# Patient Record
Sex: FEMALE | Race: BLACK OR AFRICAN AMERICAN
[De-identification: names, ages, dates, MRNs, and addresses within clinical notes are randomized per-mention and may not be internally consistent; named-entity substitution may affect disease eponyms.]

---

## 2019-12-22 NOTE — RAD
XR Chest Pa   Lat STANDARD



HISTORY: Shortness of breath



COMPARISON: None.



FINDINGS: Heart size and mediastinum are within normal limits. The lungs are clear of infiltrates. No
 significant bony findings.



IMPRESSION: No active intrathoracic disease.



Reported By: Black Caba 

Electronically Signed:  12/22/2019 10:08 PM

## 2019-12-23 NOTE — PRG
DATE OF SERVICE:  12/22/2019



PRIMARY OB:  Dr. Robinson Yancey.



CHIEF COMPLAINT:  Elevated blood pressure, proteinuria, and tachycardia.



HISTORY OF PRESENT ILLNESS:  The patient is a 29-year-old, G8, P4 female with an

intrauterine pregnancy at 32 weeks and a day, presenting to the emergency room with

complaints of congestion, headache, and sore throat for 3 days with vomiting last

week.  After evaluation there, the patient was noted to have tachycardia,

intermittent headache, congestion, and sore throat, and noted to have an elevated

temperature at 99.6 and pulse in the 120s.  The patient was diagnosed with an upper

respiratory infection and sent to Labor and Delivery for fetal monitoring.  Upon

arrival, the patient again reported that she was not feeling well, had sore throat,

and congestion and cough that she has been treating with over-the-counter

medications.  For the last few days, the patient also reports that she had been

given antibiotics in the last few weeks for strep throat that she works in a cold

environment at [employer].  During her stay, she reports that she does have

history of asthma and has some difficulty breathing at times since getting sick.

The ER physician had prescribed her albuterol downstairs and gave her a liter of

fluid.  The patient denies chest pain.  She denies nausea, vomiting, diarrhea,

constipation, hip problems, knee problems, muscle weakness.  The patient reports she

is determined to go to work tomorrow. 

The patient denies any drug use.  Denies any caffeine consumption of any kind.

Denies coffee or sodas or other sources of caffeine or stimulants. 



PAST MEDICAL HISTORY:  Asthma and suspected chronic hypertension.



PAST SURGICAL HISTORY:  Negative.



SOCIAL HISTORY:  Denies drug, alcohol, or tobacco use.



ALLERGIES:  NONE.



CURRENT MEDICATIONS:  Just newly prescribed albuterol inhaler and prenatal vitamins.



OB LABS:  Unavailable at the time of dictation.



REVIEW OF SYSTEMS:  Per HPI.



PHYSICAL EXAMINATION:

VITAL SIGNS:  Initial blood pressure is 145/77, her stay with us 3 hours repeat

blood pressures 133/87, 126/66.  140/80 and 135/75 were her most recent blood

pressure.  The patient had a temperature of 101.1 during her stay.  Remained

tachycardic in the 120s, down to the one teens and up to the lower 130s, saturating

99% to 100% on room air. 

GENERAL:  The patient initially appeared to have to be somewhat lethargic, though

alert and oriented.  Cooperative and pleasant to interact with. 

HEAD:  Normocephalic and atraumatic. 

LUNGS:  Had some wheezing and some coarse breath sounds. 

HEART:  Tachycardic, but regular rhythm. 

ABDOMEN:  Soft.  No right upper quadrant tenderness. 

EXTREMITIES:  Nontender.  They had minimal edema.  Symmetrical.  The fetus had a

baseline in the 150s with moderate long-term variability, positive accelerations, no

decelerations.  Tocometer free of contractions. 



LAB WORK:  White count of 4.5, hemoglobin 10, hematocrit 29.9, platelets 143,000.

Sodium 137, potassium 3.4, BUN of less than 4, creatinine of 0.56, calcium of 7.7.

AST is 26, ALT of 12, albumin of 2.6.  Urine specific gravity 1.009 with 300 of

protein, 20 of ketones, 1+ blood, 4 to 6 rbc's, 1+ bacteria.  Influenza type A and B

are negative.  Chest x-ray showed a heart size and mediastinum within normal limits.

 Lungs are clear of infiltrates.  No significant bony findings and no active

intrathoracic disease. 



ASSESSMENT AND PLAN:  The patient is a 29-year-old female with what appears to be a

viral respiratory infection exacerbated by underlying asthma.  During her stay here,

the patient was given a Xopenex treatment after which the patient reports that she

is able to breathe more easily.  With her being febrile and pregnant, her

tachycardia is less concerning.  She has a suppressed white count also suggesting a

viral infection of some kind.  The patient has been given instructions to seek

medical attention should she experience worsening shortness of breath or chest pain.

 I have contacted her primary OB, Dr. Yancey, who has shared that her proteinuria

and elevated blood pressures have been present since before 20 weeks and suspect

that she has baseline proteinuria with chronic hypertension.  Her stay here between

the ER and the OB/GYN Labor and Delivery visit has been in excess of 8 hours and

reviewing her course of vital signs, nothing appeared to worsen over that time.  The

patient is comfortable going home.  In fact, she was comfortable walking out the

door.  She has an appointment to see her primary OB Dr. Robinson Yancey tomorrow 4:30,

who can re-evaluate her situation.  The patient is expressed determination to go to

work.  We had recommended that she not given her fever and body aches and upper

respiratory infection, fetus has a category 1 tracing and the patient has been given

precautions. 







Job ID:  [id number]

## 2020-01-23 NOTE — ULT
Exam:

NONSTRESS FETAL BIOPHYSICAL PROFILE:



HISTORY: Poor fetal status 36 weeks pregnant. Chronic hypertension.



TECHNIQUE: Nonstress fetal biophysical profile was performed.



FINDINGS: 

Limited evaluation of the cervix due to shadowing.

Vertex presentation

Posterior placenta. The presence or absence of previa cannot be evaluated due to shadowing in the low
er uterine segment.



Fetal heart tones: 136 bpm

Amniotic fluid index 10.7 cm



Nonstress fetal biophysical profile:

Fetal tone 2

Fetal breathing 2

Fetal movement 2

Amniotic fluid 2

Total score 8 out of 8



IMPRESSION: 

Nonstress fetal biophysical profile with a total score of 8 out of 8.



Transcribed Date/Time: 1/23/2020 6:20 PM



Reported By: Christine Hoffmann 

Electronically Signed:  1/23/2020 6:29 PM

## 2020-01-23 NOTE — PDOC.LDHP
Labor and Delivery H&P


Chief complaint: other (4/8 BPP in clinic with diagnosis of Chronic 

Hypertension and Chronic (it seems) proteinurea. No evidence that preeclamsia 

has existed upto this point, with normal blooed work previously, and no otehr 

severe features today.)


HPI: 


30 y/o  at 36 weeks and 4 days, with 4/8 BPP in clinic. She presents to 

L&D for further evaluation for fetal well being and to re-investigate for any 

laboratory evidence of preeclampsia. 





Grav: 8


Para: 4


Current pregnancy complications: gestational hypertension, other (Chronic 

Proteinurea)


Abnormal US findings: Yes (4/8 BPP today in clinic, but 8/8 on repeat exam in 

hospital. NL ABELARDO.)


Current medications: pre- vitamins


Allergies/Adverse Reactions: 


 Allergies











Allergy/AdvReac Type Severity Reaction Status Date / Time


 


No Known Allergies Allergy   Verified 20 16:50














- Physical Exam


Vital signs reviewed and normal: yes


General: NAD


Heart: RRR


Lungs: CTAB


Abdomen: gravid


Extremeties: no edema


FHT: category 1





- Assessment


GHTN, now with 8/8 BPP and NL fetal heart tracing. Neg Preeclamsia blood work-

up. Urine protein to creatinine ration is high as expected, explained by 

chronic proteinurea that has been monitored and evident for several months no 

without significant change or progression. At this time, the best explanation 

of the clinical picture is CHTN, without superimposed preeclampsia. Delivery 

indicated at 38 weeks per ACOG and already scheduled.

## 2020-02-03 ENCOUNTER — HOSPITAL ENCOUNTER (INPATIENT)
Dept: HOSPITAL 92 - L&D | Age: 30
LOS: 2 days | Discharge: HOME | End: 2020-02-05
Attending: OBSTETRICS & GYNECOLOGY | Admitting: OBSTETRICS & GYNECOLOGY
Payer: COMMERCIAL

## 2020-02-03 VITALS — BODY MASS INDEX: 33 KG/M2

## 2020-02-03 DIAGNOSIS — Z23: ICD-10-CM

## 2020-02-03 DIAGNOSIS — Z3A.38: ICD-10-CM

## 2020-02-03 LAB
ALBUMIN SERPL BCG-MCNC: 2.8 G/DL (ref 3.5–5)
ALP SERPL-CCNC: 107 U/L (ref 40–110)
ALT SERPL W P-5'-P-CCNC: 7 U/L (ref 8–55)
ANION GAP SERPL CALC-SCNC: 9 MMOL/L (ref 10–20)
AST SERPL-CCNC: 14 U/L (ref 5–34)
BILIRUB SERPL-MCNC: 0.3 MG/DL (ref 0.2–1.2)
BUN SERPL-MCNC: 5 MG/DL (ref 7–18.7)
CALCIUM SERPL-MCNC: 8.3 MG/DL (ref 7.8–10.44)
CHLORIDE SERPL-SCNC: 105 MMOL/L (ref 98–107)
CO2 SERPL-SCNC: 27 MMOL/L (ref 22–29)
CREAT CL PREDICTED SERPL C-G-VRATE: 204 ML/MIN (ref 70–130)
GLOBULIN SER CALC-MCNC: 2.9 G/DL (ref 2.4–3.5)
GLUCOSE SERPL-MCNC: 77 MG/DL (ref 70–105)
HGB BLD-MCNC: 9.7 G/DL (ref 12–16)
MCH RBC QN AUTO: 24.7 PG (ref 27–31)
MCV RBC AUTO: 75.6 FL (ref 78–98)
PLATELET # BLD AUTO: 254 THOU/UL (ref 130–400)
POTASSIUM SERPL-SCNC: 3.9 MMOL/L (ref 3.5–5.1)
RBC # BLD AUTO: 3.94 MILL/UL (ref 4.2–5.4)
SODIUM SERPL-SCNC: 137 MMOL/L (ref 136–145)
SYPHILIS ANTIBODY INDEX: 0.03 S/CO
WBC # BLD AUTO: 8.5 THOU/UL (ref 4.8–10.8)

## 2020-02-03 PROCEDURE — 3E0P7VZ INTRODUCTION OF HORMONE INTO FEMALE REPRODUCTIVE, VIA NATURAL OR ARTIFICIAL OPENING: ICD-10-PCS | Performed by: OBSTETRICS & GYNECOLOGY

## 2020-02-03 PROCEDURE — 36415 COLL VENOUS BLD VENIPUNCTURE: CPT

## 2020-02-03 PROCEDURE — 86780 TREPONEMA PALLIDUM: CPT

## 2020-02-03 PROCEDURE — 85025 COMPLETE CBC W/AUTO DIFF WBC: CPT

## 2020-02-03 PROCEDURE — 36416 COLLJ CAPILLARY BLOOD SPEC: CPT

## 2020-02-03 PROCEDURE — 86900 BLOOD TYPING SEROLOGIC ABO: CPT

## 2020-02-03 PROCEDURE — 87340 HEPATITIS B SURFACE AG IA: CPT

## 2020-02-03 PROCEDURE — 86901 BLOOD TYPING SEROLOGIC RH(D): CPT

## 2020-02-03 PROCEDURE — 3E0234Z INTRODUCTION OF SERUM, TOXOID AND VACCINE INTO MUSCLE, PERCUTANEOUS APPROACH: ICD-10-PCS | Performed by: OBSTETRICS & GYNECOLOGY

## 2020-02-03 PROCEDURE — 86850 RBC ANTIBODY SCREEN: CPT

## 2020-02-03 PROCEDURE — 90715 TDAP VACCINE 7 YRS/> IM: CPT

## 2020-02-03 PROCEDURE — 3E033VJ INTRODUCTION OF OTHER HORMONE INTO PERIPHERAL VEIN, PERCUTANEOUS APPROACH: ICD-10-PCS | Performed by: OBSTETRICS & GYNECOLOGY

## 2020-02-03 PROCEDURE — 80053 COMPREHEN METABOLIC PANEL: CPT

## 2020-02-03 PROCEDURE — 85027 COMPLETE CBC AUTOMATED: CPT

## 2020-02-03 RX ADMIN — Medication SCH: at 23:48

## 2020-02-03 RX ADMIN — SODIUM CHLORIDE SCH: 0.9 INJECTION, SOLUTION INTRAVENOUS at 23:48

## 2020-02-03 NOTE — PDOC.LDHP
Labor and Delivery H&P


Chief complaint: scheduled induction


HPI: 


28 y/o G8 P 4034 at 38 and 1/7 weeks presents for term medical induction of 

labor for CHTN and chronic Proteinurea.





Current gestational age (weeks): 38


Due date: 20


Grav: 8


Para: 4


Current pregnancy complications: hypertension, other (Proteinurea)


Abnormal US findings: No


Current medications: pre- vitamins


Allergies/Adverse Reactions: 


 Allergies











Allergy/AdvReac Type Severity Reaction Status Date / Time


 


No Known Allergies Allergy   Verified 20 16:50











Social history: none





- Physical Exam


Vital signs reviewed and normal: yes


General: NAD


Heart: RRR


Lungs: CTAB


Abdomen: gravid


Extremeties: no edema


FHT: category 1





- Assessment


L&D Assessment: medically indicated induction





- Plan


Plan: admit to L&D, cervical ripening

## 2020-02-04 LAB
BASOPHILS # BLD AUTO: 0.1 THOU/UL (ref 0–0.2)
BASOPHILS NFR BLD AUTO: 0.6 % (ref 0–1)
EOSINOPHIL # BLD AUTO: 0.1 THOU/UL (ref 0–0.7)
EOSINOPHIL NFR BLD AUTO: 0.6 % (ref 0–10)
HBSAG INDEX: 0.19 S/CO (ref 0–0.99)
HGB BLD-MCNC: 9.6 G/DL (ref 12–16)
LYMPHOCYTES # BLD: 1 THOU/UL (ref 1.2–3.4)
LYMPHOCYTES NFR BLD AUTO: 8 % (ref 21–51)
MCH RBC QN AUTO: 24.3 PG (ref 27–31)
MCV RBC AUTO: 75.7 FL (ref 78–98)
MONOCYTES # BLD AUTO: 0.6 THOU/UL (ref 0.11–0.59)
MONOCYTES NFR BLD AUTO: 4.3 % (ref 0–10)
NEUTROPHILS # BLD AUTO: 11.2 THOU/UL (ref 1.4–6.5)
NEUTROPHILS NFR BLD AUTO: 86.5 % (ref 42–75)
PLATELET # BLD AUTO: 233 THOU/UL (ref 130–400)
RBC # BLD AUTO: 3.93 MILL/UL (ref 4.2–5.4)
WBC # BLD AUTO: 12.9 THOU/UL (ref 4.8–10.8)

## 2020-02-04 RX ADMIN — Medication SCH MLS: at 16:00

## 2020-02-04 RX ADMIN — Medication SCH MLS: at 13:05

## 2020-02-04 RX ADMIN — DOCUSATE CALCIUM SCH MG: 240 CAPSULE, LIQUID FILLED ORAL at 20:51

## 2020-02-04 RX ADMIN — Medication SCH MLS: at 06:59

## 2020-02-04 RX ADMIN — Medication SCH MLS: at 11:23

## 2020-02-04 RX ADMIN — HYDROCODONE BITARTRATE AND ACETAMINOPHEN PRN TAB: 5; 325 TABLET ORAL at 19:41

## 2020-02-04 RX ADMIN — Medication SCH MLS: at 03:01

## 2020-02-04 RX ADMIN — CLOSTRIDIUM TETANI TOXOID ANTIGEN (FORMALDEHYDE INACTIVATED), CORYNEBACTERIUM DIPHTHERIAE TOXOID ANTIGEN (FORMALDEHYDE INACTIVATED), BORDETELLA PERTUSSIS TOXOID ANTIGEN (GLUTARALDEHYDE INACTIVATED), BORDETELLA PERTUSSIS FILAMENTOUS HEMAGGLUTININ ANTIGEN (FORMALDEHYDE INACTIVATED), BORDETELLA PERTUSSIS PERTACTIN ANTIGEN, AND BORDETELLA PERTUSSIS FIMBRIAE 2/3 ANTIGEN ONE: 5; 2; 2.5; 5; 3; 5 INJECTION, SUSPENSION INTRAMUSCULAR at 21:31

## 2020-02-04 RX ADMIN — SODIUM CHLORIDE SCH MLS: 0.9 INJECTION, SOLUTION INTRAVENOUS at 11:22

## 2020-02-04 RX ADMIN — HYDROCODONE BITARTRATE AND ACETAMINOPHEN PRN TAB: 5; 325 TABLET ORAL at 15:11

## 2020-02-05 VITALS — SYSTOLIC BLOOD PRESSURE: 133 MMHG | DIASTOLIC BLOOD PRESSURE: 83 MMHG

## 2020-02-05 VITALS — TEMPERATURE: 98.8 F

## 2020-02-05 LAB
HGB BLD-MCNC: 9 G/DL (ref 12–16)
MCH RBC QN AUTO: 25 PG (ref 27–31)
MCV RBC AUTO: 76 FL (ref 78–98)
PLATELET # BLD AUTO: 233 THOU/UL (ref 130–400)
RBC # BLD AUTO: 3.59 MILL/UL (ref 4.2–5.4)
WBC # BLD AUTO: 10.1 THOU/UL (ref 4.8–10.8)

## 2020-02-05 RX ADMIN — HYDROCODONE BITARTRATE AND ACETAMINOPHEN PRN TAB: 5; 325 TABLET ORAL at 07:43

## 2020-02-05 RX ADMIN — DOCUSATE CALCIUM SCH MG: 240 CAPSULE, LIQUID FILLED ORAL at 07:42

## 2020-02-05 RX ADMIN — CLOSTRIDIUM TETANI TOXOID ANTIGEN (FORMALDEHYDE INACTIVATED), CORYNEBACTERIUM DIPHTHERIAE TOXOID ANTIGEN (FORMALDEHYDE INACTIVATED), BORDETELLA PERTUSSIS TOXOID ANTIGEN (GLUTARALDEHYDE INACTIVATED), BORDETELLA PERTUSSIS FILAMENTOUS HEMAGGLUTININ ANTIGEN (FORMALDEHYDE INACTIVATED), BORDETELLA PERTUSSIS PERTACTIN ANTIGEN, AND BORDETELLA PERTUSSIS FIMBRIAE 2/3 ANTIGEN ONE ML: 5; 2; 2.5; 5; 3; 5 INJECTION, SUSPENSION INTRAMUSCULAR at 14:51

## 2020-02-05 NOTE — PDOC.PP
Post Partum Progress Note


Post Partum Day #: 1


PO intake tolerated: yes


Flatus: yes


Ambulation: yes


 Vital Signs (12 hours)











  Temp Pulse Resp BP Pulse Ox


 


 02/05/20 12:45   74  20  133/83 


 


 02/05/20 11:30  98.8 F  94  18  157/94 H 


 


 02/05/20 07:35  98.0 F  79  20  136/97 H  95


 


 02/05/20 05:10  98.9 F  86  16  143/65 H 








 Weight











Weight                         211 lb

















- Physical Examination


General: NAD


Cardiovascular: no m/r/g, RRR


Respiratory: clear to auscultation bilaterally


Abdominal: + bowel sounds, lochia, no distention


Extremities: negative homans (B)


Skin: CS incision dry & intact, no rash


Neurological: no gross focal deficits


Psychiatric: A&Ox3, normal affect


Result Diagrams: 


 02/05/20 08:22





 02/03/20 22:59


Additional Labs: 


 Post Partum Labs











Blood Type  AB POSITIVE   02/03/20  23:00    


 


Hep Bs Antigen  Non-Reactive S/CO (NonReactive)   02/03/20  22:59

## 2020-07-31 ENCOUNTER — HOSPITAL ENCOUNTER (OUTPATIENT)
Dept: HOSPITAL 92 - CT | Age: 30
Discharge: HOME | End: 2020-07-31
Attending: INTERNAL MEDICINE
Payer: COMMERCIAL

## 2020-07-31 VITALS — SYSTOLIC BLOOD PRESSURE: 119 MMHG | DIASTOLIC BLOOD PRESSURE: 89 MMHG | TEMPERATURE: 98.3 F

## 2020-07-31 VITALS — BODY MASS INDEX: 28.6 KG/M2

## 2020-07-31 DIAGNOSIS — N18.1: ICD-10-CM

## 2020-07-31 DIAGNOSIS — I15.9: ICD-10-CM

## 2020-07-31 DIAGNOSIS — N06.1: Primary | ICD-10-CM

## 2020-07-31 DIAGNOSIS — Z79.899: ICD-10-CM

## 2020-07-31 DIAGNOSIS — E55.9: ICD-10-CM

## 2020-07-31 DIAGNOSIS — J45.909: ICD-10-CM

## 2020-07-31 LAB
INR PPP: 0.9
PROTHROMBIN TIME: 11.9 SEC (ref 12–14.7)

## 2020-07-31 PROCEDURE — 50200 RENAL BIOPSY PERQ: CPT

## 2020-07-31 PROCEDURE — 85610 PROTHROMBIN TIME: CPT

## 2020-07-31 PROCEDURE — 88329 PATH CONSLTJ DRG SURG: CPT

## 2020-07-31 PROCEDURE — BT22ZZZ COMPUTERIZED TOMOGRAPHY (CT SCAN) OF LEFT KIDNEY: ICD-10-PCS | Performed by: RADIOLOGY

## 2020-07-31 PROCEDURE — 77002 NEEDLE LOCALIZATION BY XRAY: CPT

## 2020-07-31 PROCEDURE — 0TB13ZX EXCISION OF LEFT KIDNEY, PERCUTANEOUS APPROACH, DIAGNOSTIC: ICD-10-PCS | Performed by: RADIOLOGY

## 2020-07-31 NOTE — CT
CT-guided renal biopsy random:

7/31/2020



HISTORY:

29-year-old female with proteinuria.



TECHNIQUE:

Signed informed consent obtained. Patient placed prone on CT table. Procedure performed under step CT
 guidance. Right renal lower pole selected as target. Overlying right flank skin prepared and

draped in usual sterile fashion. 25-gauge needle used to apply buffered lidocaine superficially. 17-g
auge introducer needle advanced to junction between right posterior lateral abdominal wall

musculature and posterior paraspinal musculature. Buffered lidocaine applied into this region through
 the introducer needle. Introducer needle further advanced to posterior aspect of left perirenal

space. Additional buffered lidocaine applied here. 18-gauge biopsy needle advanced in coaxial fashion
 through the introducer needle. Biopsy gun fired, yielding a approximately 1.3 cm renal core tissue

sample, which was given to Dr. Mtz of pathology, who performed a preliminary light microscopy roz
lysis. Some glomeruli visualized. Another 18-gauge pass with biopsy needle performed, and this

second tissue sample was placed directly into saline. Introducer needle removed. Abdomen scanned with
 patient supine. 3 hour delayed CT abdomen performed. Patient tolerated procedure well. No

complications.



FINDINGS:

Step CT image demonstrates introducer needle tip at posterior surface of the lower pole of left kidne
y. Adjacent fat stranding around the tip of needle represents injected lidocaine. Both the

immediate and delayed post procedure images demonstrate no hemorrhage at all.



IMPRESSION:

Technically successful CT-guided random biopsy of left kidney yielding 18-gauge 1.3 cm core tissue sa
mples x2.



Reported By: Jamel Mcghee 

Electronically Signed:  7/31/2020 1:30 PM

## 2021-08-19 ENCOUNTER — HOSPITAL ENCOUNTER (EMERGENCY)
Dept: HOSPITAL 92 - CSHERS | Age: 31
Discharge: HOME | End: 2021-08-19
Payer: COMMERCIAL

## 2021-08-19 DIAGNOSIS — J45.909: ICD-10-CM

## 2021-08-19 DIAGNOSIS — K02.9: Primary | ICD-10-CM

## 2021-08-19 DIAGNOSIS — I10: ICD-10-CM

## 2021-08-19 DIAGNOSIS — N28.9: ICD-10-CM

## 2021-08-19 PROCEDURE — 99282 EMERGENCY DEPT VISIT SF MDM: CPT

## 2023-02-27 ENCOUNTER — HOSPITAL ENCOUNTER (EMERGENCY)
Dept: HOSPITAL 92 - CSHERS | Age: 33
Discharge: HOME | End: 2023-02-27
Payer: SELF-PAY

## 2023-02-27 DIAGNOSIS — Z11.3: Primary | ICD-10-CM

## 2023-02-27 LAB
BACTERIA UR QL AUTO: (no result) HPF
LEUKOCYTE ESTERASE UR QL STRIP.AUTO: 25
PREGU CONTROL BACKGROUND?: (no result)
PREGU CONTROL BAR APPEAR?: YES
PROT UR STRIP.AUTO-MCNC: 30 MG/DL
RBC UR QL AUTO: (no result) HPF (ref 0–3)
SP GR UR STRIP: 1 (ref 1–1.03)
WBC UR QL AUTO: (no result) HPF (ref 0–3)

## 2023-02-27 PROCEDURE — 81025 URINE PREGNANCY TEST: CPT

## 2023-02-27 PROCEDURE — 87591 N.GONORRHOEAE DNA AMP PROB: CPT

## 2023-02-27 PROCEDURE — 81003 URINALYSIS AUTO W/O SCOPE: CPT

## 2023-02-27 PROCEDURE — 99283 EMERGENCY DEPT VISIT LOW MDM: CPT

## 2023-02-27 PROCEDURE — 87491 CHLMYD TRACH DNA AMP PROBE: CPT

## 2023-02-27 PROCEDURE — 81015 MICROSCOPIC EXAM OF URINE: CPT

## 2023-02-27 PROCEDURE — 96372 THER/PROPH/DIAG INJ SC/IM: CPT

## 2023-04-27 ENCOUNTER — HOSPITAL ENCOUNTER (EMERGENCY)
Dept: HOSPITAL 92 - ERS | Age: 33
Discharge: HOME | End: 2023-04-27
Payer: COMMERCIAL

## 2023-04-27 DIAGNOSIS — I10: ICD-10-CM

## 2023-04-27 DIAGNOSIS — M79.89: Primary | ICD-10-CM

## 2023-04-27 DIAGNOSIS — N04.1: ICD-10-CM

## 2023-04-27 LAB
ALBUMIN SERPL BCG-MCNC: 2.4 G/DL (ref 3.5–5)
ALP SERPL-CCNC: 69 U/L (ref 40–110)
ALT SERPL W P-5'-P-CCNC: 14 U/L (ref 8–55)
ANION GAP SERPL CALC-SCNC: 11 MMOL/L (ref 10–20)
AST SERPL-CCNC: 19 U/L (ref 5–34)
BASOPHILS # BLD AUTO: 0 THOU/UL (ref 0–0.2)
BASOPHILS NFR BLD AUTO: 0.6 % (ref 0–1)
BILIRUB SERPL-MCNC: 0.2 MG/DL (ref 0.2–1.2)
BUN SERPL-MCNC: 8 MG/DL (ref 7–18.7)
CALCIUM SERPL-MCNC: 8.4 MG/DL (ref 7.8–10.44)
CHLORIDE SERPL-SCNC: 106 MMOL/L (ref 98–107)
CO2 SERPL-SCNC: 24 MMOL/L (ref 22–29)
CREAT CL PREDICTED SERPL C-G-VRATE: 0 ML/MIN (ref 70–130)
EOSINOPHIL # BLD AUTO: 0.4 THOU/UL (ref 0–0.7)
EOSINOPHIL NFR BLD AUTO: 7.5 % (ref 0–10)
GLOBULIN SER CALC-MCNC: 2.8 G/DL (ref 2.4–3.5)
GLUCOSE SERPL-MCNC: 94 MG/DL (ref 70–105)
HGB BLD-MCNC: 10.8 G/DL (ref 12–16)
LYMPHOCYTES # BLD: 1.8 THOU/UL (ref 1.2–3.4)
LYMPHOCYTES NFR BLD AUTO: 29.1 % (ref 21–51)
MCH RBC QN AUTO: 27.2 PG (ref 27–31)
MCV RBC AUTO: 81.4 FL (ref 78–98)
MONOCYTES # BLD AUTO: 0.4 THOU/UL (ref 0.11–0.59)
MONOCYTES NFR BLD AUTO: 6.1 % (ref 0–10)
NEUTROPHILS # BLD AUTO: 3.4 THOU/UL (ref 1.4–6.5)
NEUTROPHILS NFR BLD AUTO: 56.7 % (ref 42–75)
PLATELET # BLD AUTO: 254 10X3/UL (ref 130–400)
POTASSIUM SERPL-SCNC: 4 MMOL/L (ref 3.5–5.1)
PREGS CONTROL BACKGROUND?: (no result)
PREGS CONTROL BAR APPEAR?: YES
PROT UR STRIP.AUTO-MCNC: 100 MG/DL
RBC # BLD AUTO: 3.97 MILL/UL (ref 4.2–5.4)
SODIUM SERPL-SCNC: 137 MMOL/L (ref 136–145)
SP GR UR STRIP: 1.01 (ref 1–1.04)
WBC # BLD AUTO: 6 10X3/UL (ref 4.8–10.8)
WBC UR QL AUTO: (no result) HPF (ref 0–3)

## 2023-04-27 PROCEDURE — 81003 URINALYSIS AUTO W/O SCOPE: CPT

## 2023-04-27 PROCEDURE — 36415 COLL VENOUS BLD VENIPUNCTURE: CPT

## 2023-04-27 PROCEDURE — 80053 COMPREHEN METABOLIC PANEL: CPT

## 2023-04-27 PROCEDURE — 93005 ELECTROCARDIOGRAM TRACING: CPT

## 2023-04-27 PROCEDURE — 85025 COMPLETE CBC W/AUTO DIFF WBC: CPT

## 2023-04-27 PROCEDURE — 81015 MICROSCOPIC EXAM OF URINE: CPT

## 2023-04-27 PROCEDURE — 84703 CHORIONIC GONADOTROPIN ASSAY: CPT

## 2023-06-16 ENCOUNTER — HOSPITAL ENCOUNTER (EMERGENCY)
Dept: HOSPITAL 92 - CSHERS | Age: 33
Discharge: HOME | End: 2023-06-16
Payer: COMMERCIAL

## 2023-06-16 DIAGNOSIS — I10: ICD-10-CM

## 2023-06-16 DIAGNOSIS — R05.9: Primary | ICD-10-CM

## 2023-06-16 PROCEDURE — 99283 EMERGENCY DEPT VISIT LOW MDM: CPT
